# Patient Record
Sex: MALE | Race: WHITE | NOT HISPANIC OR LATINO | Employment: FULL TIME | URBAN - METROPOLITAN AREA
[De-identification: names, ages, dates, MRNs, and addresses within clinical notes are randomized per-mention and may not be internally consistent; named-entity substitution may affect disease eponyms.]

---

## 2024-04-08 ENCOUNTER — OFFICE VISIT (OUTPATIENT)
Dept: FAMILY MEDICINE CLINIC | Facility: CLINIC | Age: 24
End: 2024-04-08

## 2024-04-08 VITALS
WEIGHT: 137 LBS | HEART RATE: 74 BPM | BODY MASS INDEX: 18.16 KG/M2 | SYSTOLIC BLOOD PRESSURE: 122 MMHG | DIASTOLIC BLOOD PRESSURE: 68 MMHG | RESPIRATION RATE: 18 BRPM | OXYGEN SATURATION: 98 % | HEIGHT: 73 IN

## 2024-04-08 DIAGNOSIS — I49.9 CARDIAC ARRHYTHMIA, UNSPECIFIED CARDIAC ARRHYTHMIA TYPE: Primary | ICD-10-CM

## 2024-04-08 PROCEDURE — 99203 OFFICE O/P NEW LOW 30 MIN: CPT | Performed by: STUDENT IN AN ORGANIZED HEALTH CARE EDUCATION/TRAINING PROGRAM

## 2024-04-08 NOTE — PROGRESS NOTES
Pierce Alcantar DO  [facility] PR[facility]  [address]  [place] [id number] [phone]    Subjective      Name Jaron Marks MRN [id number]    2000 AGE/SEX 23 y.o. / male      Chief Complaint Chief Complaint   Patient presents with    Establish Care     Here to establish      Dizziness     X 1 week ago he drank energy drink and the started feeling weak and dizzy, having some palpitations. Symptoms lasted for about a week          Visit History for  2024    History of Present Illness  Jaron Marks is a 23 y.o. male who presented today for Establish Care (Here to establish/) and Dizziness (X 1 week ago he drank energy drink and the started feeling weak and dizzy, having some palpitations. 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 He experienced mild dizziness approximately one to two hours post-consumption, accompanied by a sensation akin to fainting. Despite not considering the cause, he continued to feel unwell for the remainder of the week, including fatigue, instability, and general malaise. He has abstained from energy drinks and Cokes since the incident. Accompanying symptoms included occasional chest discomfort lasting for a week, occasional tachycardia during sleep, and mild chest discomfort.     Symptoms have since resolved and he reports feeling completely normal. His caffeine intake does not exceed 150 mg, a habit he has not done recently. He speculated that his symptoms might be due to hypotension, but his blood pressure readings have consistently been within the normal range, never exceeding 130 systolic. He also reported feeling better during physical labor at his workplace.        Medications and Allergies   No current outpatient medications  No Known Allergies   I have reviewed the above medications and allergies     Objective:  "     Vitals Vitals:    04/08/24 1001   BP: 122/68   BP Location: Right arm   Patient Position: Sitting   Cuff Size: Adult   Pulse: 74   Resp: 18   SpO2: 98%   Weight: 62.1 kg (137 lb)   Height: 185.4 cm (73\")     Body mass index is 18.07 kg/m².    Physical Exam  Vitals reviewed.   Constitutional:       General: He is not in acute distress.     Appearance: He is not ill-appearing.   Cardiovascular:      Rate and Rhythm: Normal rate and regular rhythm.   Pulmonary:      Effort: Pulmonary effort is normal.      Breath sounds: Normal breath sounds.   Neurological:      Mental Status: He is alert.   Psychiatric:         Mood and Affect: Mood normal.         Behavior: Behavior normal.         Thought Content: Thought content normal.         Judgment: Judgment normal.             Assessment/Plan      Issues Addressed/ Plan   Diagnosis Plan   1. Cardiac arrhythmia, unspecified cardiac arrhythmia type           Assessment & Plan  1. Arrhythmia.  The patient's symptoms suggest an episode of palpitations. A comprehensive discussion was held regarding the various causes of arrhythmia, including the use of steroids. The patient was advised to monitor his heart rate using a Galaxy watch. He was also advised to reduce his caffeine intake. Should the patient experience another episode of arrhythmia, a 24-hour heart monitor will be considered.    There are no Patient Instructions on file for this visit.        Follow up  recommended Return in about 6 months (around 10/8/2024) for Annual physical.   - Dragon voice recognition software was utilized to complete this chart.  Every reasonable attempt was made to edit and correct the text, however some incorrect words may remain.   Pierce Alcantar DO    Patient or patient representative verbalized consent for the use of Ambient Listening during the visit with  Pierce Alcantar DO for chart documentation. 4/8/2024  10:42 EDT             "

## 2024-05-07 ENCOUNTER — OFFICE VISIT (OUTPATIENT)
Dept: FAMILY MEDICINE CLINIC | Facility: CLINIC | Age: 24
End: 2024-05-07
Payer: COMMERCIAL

## 2024-05-07 VITALS
DIASTOLIC BLOOD PRESSURE: 84 MMHG | HEIGHT: 73 IN | HEART RATE: 64 BPM | RESPIRATION RATE: 18 BRPM | WEIGHT: 137 LBS | BODY MASS INDEX: 18.16 KG/M2 | SYSTOLIC BLOOD PRESSURE: 132 MMHG | OXYGEN SATURATION: 98 %

## 2024-05-07 DIAGNOSIS — R00.0 TACHYCARDIA: Primary | ICD-10-CM

## 2024-05-07 PROCEDURE — 99213 OFFICE O/P EST LOW 20 MIN: CPT | Performed by: STUDENT IN AN ORGANIZED HEALTH CARE EDUCATION/TRAINING PROGRAM

## 2024-05-25 NOTE — PROGRESS NOTES
"    Pierce Alcantar DO  [facility]  [address] [place] [address] [address]  [place] [id number] [phone]    Subjective      Name Jaron Marks MRN [id number]    2000 AGE/SEX 23 y.o. / male      Chief Complaint Chief Complaint   Patient presents with    Dizziness     Was at work last night and got light headed, heart was racing and got very sweaty. Felt really weird. Also felt weird when he was going to sleep. Heart rate got up to 150 according to watch.          Visit History for  2024    History of Present Illness  Jaron Marks is a 23 y.o. male who presented today for Dizziness (Was at work last night and got light headed, heart was racing and got very sweaty. Felt really weird. Also felt weird when he was going to sleep. Heart rate got up to 150 according to watch. )      The patient reported an episode of tachycardia at approximately 11 PM last night, which necessitated early departure from work. He monitored his heart rate using a watch, which was recorded at 150, accompanied by perspiration, lightheadedness, and a pre-syncope sensation. Despite his efforts to relax at work, his heart rate fluctuated, reaching 100 while at rest. He consumed cocoa on the day of the episode, but has abstained from energy drinks for approximately 2 months. His daily exercise routine involves pushing and pulling 4000-pound cans. He denies experiencing dyspnea during these episodes. His diet consisted of pizza and cereal the previous day.        Medications and Allergies   No current outpatient medications  No Known Allergies   I have reviewed the above medications and allergies     Objective:      Vitals Vitals:    24 1612   BP: 132/84   BP Location: Left arm   Patient Position: Sitting   Cuff Size: Adult   Pulse: 64   Resp: 18   SpO2: 98%   Weight: 62.1 kg (137 lb)   Height: 185.4 cm (73\")     Body mass index is 18.07 kg/m².    Physical Exam  Vitals reviewed.   Constitutional:       " General: He is not in acute distress.     Appearance: He is not ill-appearing.   Cardiovascular:      Rate and Rhythm: Normal rate and regular rhythm.   Pulmonary:      Effort: Pulmonary effort is normal.      Breath sounds: Normal breath sounds.   Neurological:      Mental Status: He is alert.   Psychiatric:         Mood and Affect: Mood normal.         Behavior: Behavior normal.         Thought Content: Thought content normal.         Judgment: Judgment normal.       Physical Exam     Results          Assessment/Plan      Issues Addressed/ Plan   Diagnosis Plan   1. Tachycardia           Assessment & Plan  1. Tachycardia.  The patient's heart rate during physical exertion can reach up to 200. The patient was advised to augment his protein intake to a minimum of 30 grams prior to work to enhance muscle mass. He was also advised to maintain adequate hydration and consume food prior to consuming energy drinks. Additionally, he was recommended to consume electrolyte packages and Liquid IV. Should the patient's symptoms persist, a heart monitor will be considered.   BMI is below normal parameters (malnutrition). Recommendations: Going to work on increasing protein       There are no Patient Instructions on file for this visit.        Follow up  recommended Return in about 3 months (around 8/7/2024) for Annual physical.   - Dragon voice recognition software was utilized to complete this chart.  Every reasonable attempt was made to edit and correct the text, however some incorrect words may remain.   Pierce Alcantar DO    Patient or patient representative verbalized consent for the use of Ambient Listening during the visit with  Pierce Alcantar DO for chart documentation. 5/24/2024  23:31 EDT

## 2024-06-03 ENCOUNTER — OFFICE VISIT (OUTPATIENT)
Dept: FAMILY MEDICINE CLINIC | Facility: CLINIC | Age: 24
End: 2024-06-03
Payer: COMMERCIAL

## 2024-06-03 VITALS
HEIGHT: 73 IN | HEART RATE: 67 BPM | DIASTOLIC BLOOD PRESSURE: 76 MMHG | RESPIRATION RATE: 18 BRPM | SYSTOLIC BLOOD PRESSURE: 96 MMHG | WEIGHT: 136 LBS | BODY MASS INDEX: 18.02 KG/M2 | OXYGEN SATURATION: 100 %

## 2024-06-03 DIAGNOSIS — R42 DIZZINESS: ICD-10-CM

## 2024-06-03 DIAGNOSIS — R00.0 TACHYCARDIA: Primary | ICD-10-CM

## 2024-06-03 DIAGNOSIS — R42 LIGHTHEADEDNESS: ICD-10-CM

## 2024-06-03 PROCEDURE — 99213 OFFICE O/P EST LOW 20 MIN: CPT | Performed by: STUDENT IN AN ORGANIZED HEALTH CARE EDUCATION/TRAINING PROGRAM

## 2024-06-03 NOTE — PROGRESS NOTES
Venipuncture Blood Specimen Collection  Venipuncture performed in left arm by Anne Rain MA with good hemostasis. Patient tolerated the procedure well without complications.   06/03/24   Anne Rain MA

## 2024-06-04 LAB
25(OH)D3+25(OH)D2 SERPL-MCNC: 27.4 NG/ML (ref 30–100)
ALBUMIN SERPL-MCNC: 4.9 G/DL (ref 3.5–5.2)
ALBUMIN/GLOB SERPL: 1.7 G/DL
ALP SERPL-CCNC: 60 U/L (ref 39–117)
ALT SERPL-CCNC: 16 U/L (ref 1–41)
AST SERPL-CCNC: 15 U/L (ref 1–40)
BASOPHILS # BLD AUTO: 0.06 10*3/MM3 (ref 0–0.2)
BASOPHILS NFR BLD AUTO: 1.2 % (ref 0–1.5)
BILIRUB SERPL-MCNC: 0.4 MG/DL (ref 0–1.2)
BUN SERPL-MCNC: 11 MG/DL (ref 6–20)
BUN/CREAT SERPL: 12 (ref 7–25)
CALCIUM SERPL-MCNC: 10 MG/DL (ref 8.6–10.5)
CHLORIDE SERPL-SCNC: 104 MMOL/L (ref 98–107)
CO2 SERPL-SCNC: 26 MMOL/L (ref 22–29)
CREAT SERPL-MCNC: 0.92 MG/DL (ref 0.76–1.27)
EGFRCR SERPLBLD CKD-EPI 2021: 119.1 ML/MIN/1.73
EOSINOPHIL # BLD AUTO: 0.12 10*3/MM3 (ref 0–0.4)
EOSINOPHIL NFR BLD AUTO: 2.3 % (ref 0.3–6.2)
ERYTHROCYTE [DISTWIDTH] IN BLOOD BY AUTOMATED COUNT: 12.2 % (ref 12.3–15.4)
GLOBULIN SER CALC-MCNC: 2.9 GM/DL
GLUCOSE SERPL-MCNC: 88 MG/DL (ref 65–99)
HCT VFR BLD AUTO: 43.4 % (ref 37.5–51)
HGB BLD-MCNC: 14.5 G/DL (ref 13–17.7)
IMM GRANULOCYTES # BLD AUTO: 0.01 10*3/MM3 (ref 0–0.05)
IMM GRANULOCYTES NFR BLD AUTO: 0.2 % (ref 0–0.5)
IRON SATN MFR SERPL: 19 % (ref 20–50)
IRON SERPL-MCNC: 78 MCG/DL (ref 59–158)
LYMPHOCYTES # BLD AUTO: 1.96 10*3/MM3 (ref 0.7–3.1)
LYMPHOCYTES NFR BLD AUTO: 37.8 % (ref 19.6–45.3)
MCH RBC QN AUTO: 29.7 PG (ref 26.6–33)
MCHC RBC AUTO-ENTMCNC: 33.4 G/DL (ref 31.5–35.7)
MCV RBC AUTO: 88.8 FL (ref 79–97)
MONOCYTES # BLD AUTO: 0.35 10*3/MM3 (ref 0.1–0.9)
MONOCYTES NFR BLD AUTO: 6.8 % (ref 5–12)
NEUTROPHILS # BLD AUTO: 2.68 10*3/MM3 (ref 1.7–7)
NEUTROPHILS NFR BLD AUTO: 51.7 % (ref 42.7–76)
NRBC BLD AUTO-RTO: 0 /100 WBC (ref 0–0.2)
PLATELET # BLD AUTO: 236 10*3/MM3 (ref 140–450)
POTASSIUM SERPL-SCNC: 4.4 MMOL/L (ref 3.5–5.2)
PROT SERPL-MCNC: 7.8 G/DL (ref 6–8.5)
RBC # BLD AUTO: 4.89 10*6/MM3 (ref 4.14–5.8)
SODIUM SERPL-SCNC: 141 MMOL/L (ref 136–145)
TIBC SERPL-MCNC: 408 MCG/DL
UIBC SERPL-MCNC: 330 MCG/DL (ref 112–346)
VIT B12 SERPL-MCNC: 636 PG/ML (ref 211–946)
WBC # BLD AUTO: 5.18 10*3/MM3 (ref 3.4–10.8)

## 2024-06-08 NOTE — PROGRESS NOTES
Pierce Alcantar DO  [facility] PR[facility]  [facility]  [place] [id number] [phone]    Subjective      Name Jaron Marks MRN [id number]    2000 AGE/SEX 23 y.o. / male      Chief Complaint Chief Complaint   Patient presents with    Palpitations         Visit History for  2024    Jaron Marks is a 23 y.o. male who presented today for Palpitations     History of Present Illness  The patient has been experiencing persistent episodes of motion sickness and dizziness since Tuesday, which have not improved or worsened. These symptoms, which he had previously experienced, have not lasted this long. The dizziness is particularly noticeable when entering a vehicle. Despite the absence of vomiting, the severity of the dizziness remains unchanged. Previously, he experienced headaches, but these have since resolved. 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 Despite returning to work that night, he continued to feel unwell throughout the night. The dizziness is less severe when sitting or lying down, but upon standing, he experiences balance issues, difficulty focusing, and lightheadedness. Despite these symptoms, he maintains adequate hydration and avoids caffeine. His sleep patterns are normal, but he reports anxiety related to current circumstances. He has been taking a multivitamin for the past 3 weeks, but this has not provided relief. He also reports occasional palpitations. His dietary habits have improved, and he has lost weight.       Medications and Allergies   No current outpatient medications  No Known Allergies   I have reviewed the above medications and allergies     Objective:      Vitals Vitals:    24 0917   BP: 96/76   BP Location: Left arm   Patient Position: Sitting   Cuff Size: Adult   Pulse: 67   Resp: 18   SpO2: 100%   Weight: 61.7 kg (136 lb)   Height:  "185.4 cm (73\")     Body mass index is 17.94 kg/m².    Physical Exam  Vitals reviewed.   Constitutional:       General: He is not in acute distress.     Appearance: He is not ill-appearing.   Cardiovascular:      Rate and Rhythm: Normal rate and regular rhythm.   Pulmonary:      Effort: Pulmonary effort is normal.      Breath sounds: Normal breath sounds.   Neurological:      General: No focal deficit present.   Psychiatric:         Mood and Affect: Mood normal.         Behavior: Behavior normal.         Thought Content: Thought content normal.         Judgment: Judgment normal.        Physical Exam       Results       Assessment/Plan   Issues Addressed/ Plan   Diagnosis Plan   1. Tachycardia  CBC w AUTO Differential    Comprehensive metabolic panel    Iron Profile    Vitamin D 25 hydroxy    Vitamin B12    Holter Monitor - 72 Hour Up To 15 Days      2. Dizziness  CBC w AUTO Differential    Comprehensive metabolic panel    Iron Profile    Vitamin D 25 hydroxy    Vitamin B12    Holter Monitor - 72 Hour Up To 15 Days      3. Lightheadedness  CBC w AUTO Differential    Comprehensive metabolic panel    Iron Profile    Vitamin D 25 hydroxy    Vitamin B12    Holter Monitor - 72 Hour Up To 15 Days         Assessment & Plan  1. Dizziness.  The patient's eye examination and vestibular testing yielded normal results. A comprehensive blood workup will be conducted today. An event monitor will be worn for a duration of 7 days.    Follow-up  A follow-up appointment is scheduled for 1.5 to 2 weeks post-monitoring.           There are no Patient Instructions on file for this visit.   Follow up  recommended Return in about 2 weeks (around 6/17/2024) for dizziness.   - Dragon voice recognition software was utilized to complete this chart.  Every reasonable attempt was made to edit and correct the text, however some incorrect words may remain.   Pierce Alcantar, DO    Patient or patient representative verbalized consent for the use of " Ambient Listening during the visit with  Peirce Alcantar DO for chart documentation. 6/7/2024  22:55 EDT

## 2024-06-14 ENCOUNTER — OFFICE VISIT (OUTPATIENT)
Dept: FAMILY MEDICINE CLINIC | Facility: CLINIC | Age: 24
End: 2024-06-14
Payer: COMMERCIAL

## 2024-06-14 VITALS
BODY MASS INDEX: 17.76 KG/M2 | DIASTOLIC BLOOD PRESSURE: 64 MMHG | SYSTOLIC BLOOD PRESSURE: 108 MMHG | RESPIRATION RATE: 18 BRPM | OXYGEN SATURATION: 98 % | WEIGHT: 134 LBS | HEIGHT: 73 IN | HEART RATE: 70 BPM

## 2024-06-14 DIAGNOSIS — D50.8 IRON DEFICIENCY ANEMIA SECONDARY TO INADEQUATE DIETARY IRON INTAKE: ICD-10-CM

## 2024-06-14 DIAGNOSIS — R42 LIGHTHEADEDNESS: Primary | ICD-10-CM

## 2024-06-14 PROCEDURE — 99213 OFFICE O/P EST LOW 20 MIN: CPT | Performed by: STUDENT IN AN ORGANIZED HEALTH CARE EDUCATION/TRAINING PROGRAM

## 2024-06-14 RX ORDER — FERROUS SULFATE 325(65) MG
325 TABLET ORAL 2 TIMES DAILY
COMMUNITY

## 2024-06-14 RX ORDER — MELATONIN
1000 DAILY
COMMUNITY

## 2024-06-19 ENCOUNTER — PATIENT ROUNDING (BHMG ONLY) (OUTPATIENT)
Dept: FAMILY MEDICINE CLINIC | Facility: CLINIC | Age: 24
End: 2024-06-19
Payer: COMMERCIAL

## 2024-06-19 NOTE — PROGRESS NOTES
A Pitzi message has been sent to the patient for [facility]IENT [place] [facility]

## 2024-06-21 NOTE — PROGRESS NOTES
"    Pierce lAcantar DO  [facility] PRIMARY [facility]ARE  [address]  [place] [id number] [phone]    Subjective      Name Jaron Marks MRN [id number]    2000 AGE/SEX 23 y.o. / male      Chief Complaint Chief Complaint   Patient presents with    Dizziness     2 week follow up          Visit History for  2024    Jaron Marks is a 23 y.o. male who presented today for Dizziness (2 week follow up )     History of Present Illness  The patient presents for evaluation of multiple medical concerns.    The patient reports experiencing difficulty with bowel movements, necessitating the frequent use of toilet paper. He is currently on a regimen of iron supplements and vitamin D. He has recently incorporated protein into his diet and has incorporated more red meat, chicken, and eggs into his diet.       Medications and Allergies   Current Outpatient Medications   Medication Instructions    cholecalciferol (VITAMIN D3) 1,000 Units, Oral, Daily    ferrous sulfate 325 mg, Oral, 2 Times Daily     No Known Allergies   I have reviewed the above medications and allergies     Objective:      Vitals Vitals:    24 1146   BP: 108/64   BP Location: Left arm   Patient Position: Sitting   Cuff Size: Adult   Pulse: 70   Resp: 18   SpO2: 98%   Weight: 60.8 kg (134 lb)   Height: 185.4 cm (73\")     Body mass index is 17.68 kg/m².    Physical Exam  Vitals reviewed.   Constitutional:       General: He is not in acute distress.     Appearance: He is not ill-appearing.   Pulmonary:      Effort: Pulmonary effort is normal.   Psychiatric:         Mood and Affect: Mood normal.         Behavior: Behavior normal.         Thought Content: Thought content normal.         Judgment: Judgment normal.          Physical Exam  Vital Signs  Patient's weight is 134.     Results       Assessment/Plan   Issues Addressed/ Plan   Diagnosis Plan   1. Lightheadedness        2. Iron deficiency anemia secondary to inadequate " dietary iron intake           Assessment & Plan  1. Iron-deficiency anemia.  The patient is advised to transition from ferrous sulfate to ferrous citrate for a duration of one month. Additionally, he is encouraged to incorporate more red meat into his diet.    2. Health maintenance.  The patient is current with his vaccinations. The patient is advised to abstain from utilizing the heart monitor unless he is asymptomatic. He is encouraged to focus on weight reduction and muscle mass.    Follow-up  The patient is scheduled for a physical examination in 3 months.           There are no Patient Instructions on file for this visit.   Follow up  recommended Return in about 3 months (around 9/14/2024) for Annual physical.   - Dragon voice recognition software was utilized to complete this chart.  Every reasonable attempt was made to edit and correct the text, however some incorrect words may remain.   Pierce Alcantar DO    Patient or patient representative verbalized consent for the use of Ambient Listening during the visit with  Pierce Alcantar DO for chart documentation. 6/21/2024  14:01 EDT

## 2024-08-09 ENCOUNTER — PROCEDURE VISIT (OUTPATIENT)
Dept: FAMILY MEDICINE CLINIC | Facility: CLINIC | Age: 24
End: 2024-08-09
Payer: COMMERCIAL

## 2024-08-09 VITALS
HEART RATE: 65 BPM | OXYGEN SATURATION: 98 % | RESPIRATION RATE: 18 BRPM | DIASTOLIC BLOOD PRESSURE: 68 MMHG | SYSTOLIC BLOOD PRESSURE: 122 MMHG | WEIGHT: 137 LBS | BODY MASS INDEX: 18.16 KG/M2 | HEIGHT: 73 IN

## 2024-08-09 DIAGNOSIS — M54.50 ACUTE BILATERAL LOW BACK PAIN WITHOUT SCIATICA: Primary | ICD-10-CM

## 2024-08-09 PROCEDURE — 99213 OFFICE O/P EST LOW 20 MIN: CPT | Performed by: STUDENT IN AN ORGANIZED HEALTH CARE EDUCATION/TRAINING PROGRAM

## 2024-08-20 NOTE — PROGRESS NOTES
Pierce Alcantar DO  [facility]  [facility] [address]  [place] [id number] [phone]    Subjective      Name Jaron Marks MRN [id number]    2000 AGE/SEX 23 y.o. / male      Chief Complaint Chief Complaint   Patient presents with    Back Pain     Back pain for the past 3 weeks,lower back pain          Visit History for  2024    Jaron Marks is a 23 y.o. male who presented today for Back Pain (Back pain for the past 3 weeks,lower back pain )     History of Present Illness  The patient presents for evaluation of back pain.    He began experiencing sudden back pain, the cause of which he can not recall. The pain, which he describes as sharp, intensifies when he bends over or lifts heavy objects, and then gradually aches over time. The pain, which lasts for a day, subsides, and then recurs if he lifts heavy objects. Currently, he is pain-free, but he has been lifting heavy objects with his legs recently. He used a back brace for a day, but it caused pain in his buttocks, leading him to stop wearing it. Instead, he has been avoiding bending over. The pain is more pronounced on the right side, but standing straight up or lying down alleviates the pain. He does not perform stretches at work. He sleeps on his stomach but denies any neck problems. He is making efforts to improve his iron intake, taking his iron in the morning, and consuming a protein shake in the middle of the day. He took 1 or 2 ibuprofen, which did not provide relief.       Medications and Allergies   Current Outpatient Medications   Medication Instructions    cholecalciferol (VITAMIN D3) 1,000 Units, Oral, Daily    ferrous sulfate 325 mg, Oral, 2 Times Daily     No Known Allergies   I have reviewed the above medications and allergies     Objective:      Vitals Vitals:    24 1053   BP: 122/68   BP Location: Left arm   Patient Position: Sitting   Cuff Size: Adult   Pulse: 65   Resp: 18   SpO2: 98%  "  Weight: 62.1 kg (137 lb)   Height: 185.4 cm (73\")     Body mass index is 18.07 kg/m².    Physical Exam  Vitals reviewed.   Constitutional:       General: He is not in acute distress.     Appearance: He is not ill-appearing.   Pulmonary:      Effort: Pulmonary effort is normal.   Musculoskeletal:      Comments: Bilateral paraspinal fullness on lumbar spine   Psychiatric:         Mood and Affect: Mood normal.         Behavior: Behavior normal.         Thought Content: Thought content normal.         Judgment: Judgment normal.        Physical Exam       Results       Assessment/Plan   Issues Addressed/ Plan   Diagnosis Plan   1. Acute bilateral low back pain without sciatica           Assessment & Plan  1. Muscular tension.  There is no indication of bony abnormalities in his lower back. However, there is slight tightness in his sacral area, suggesting muscular tension. He was shown stretches to perform at home. Emphasis was placed on the importance of maintaining good body mechanics during stretching exercises and when lifting.  Core exercises utilizing body weight was recommended as well.  Postural awareness was emphasized. He was advised to take Aleve twice daily, and Tylenol every 4 to 6 hours as needed. Ice application for 15 to 20 minutes and heat pads for 30 minutes were suggested.       There are no Patient Instructions on file for this visit.   Follow up  recommended Return if symptoms worsen or fail to improve.   - Dragon voice recognition software was utilized to complete this chart.  Every reasonable attempt was made to edit and correct the text, however some incorrect words may remain.   Pierce Alcantar DO    Patient or patient representative verbalized consent for the use of Ambient Listening during the visit with  Pierce Alcantar DO for chart documentation. 8/19/2024  23:01 EDT    "

## 2024-10-08 ENCOUNTER — OFFICE VISIT (OUTPATIENT)
Dept: FAMILY MEDICINE CLINIC | Facility: CLINIC | Age: 24
End: 2024-10-08
Payer: COMMERCIAL

## 2024-10-08 VITALS
OXYGEN SATURATION: 97 % | DIASTOLIC BLOOD PRESSURE: 78 MMHG | HEIGHT: 73 IN | BODY MASS INDEX: 18.55 KG/M2 | WEIGHT: 140 LBS | RESPIRATION RATE: 16 BRPM | HEART RATE: 68 BPM | SYSTOLIC BLOOD PRESSURE: 92 MMHG

## 2024-10-08 DIAGNOSIS — E55.9 VITAMIN D DEFICIENCY: ICD-10-CM

## 2024-10-08 DIAGNOSIS — D50.8 IRON DEFICIENCY ANEMIA SECONDARY TO INADEQUATE DIETARY IRON INTAKE: ICD-10-CM

## 2024-10-08 DIAGNOSIS — Z00.00 ENCOUNTER FOR WELL ADULT EXAM WITHOUT ABNORMAL FINDINGS: Primary | ICD-10-CM

## 2024-10-08 DIAGNOSIS — R53.83 FATIGUE, UNSPECIFIED TYPE: ICD-10-CM

## 2024-10-08 DIAGNOSIS — Z13.220 SCREENING, LIPID: ICD-10-CM

## 2024-10-08 PROCEDURE — 36415 COLL VENOUS BLD VENIPUNCTURE: CPT | Performed by: STUDENT IN AN ORGANIZED HEALTH CARE EDUCATION/TRAINING PROGRAM

## 2024-10-08 PROCEDURE — 99395 PREV VISIT EST AGE 18-39: CPT | Performed by: STUDENT IN AN ORGANIZED HEALTH CARE EDUCATION/TRAINING PROGRAM

## 2024-10-08 NOTE — PROGRESS NOTES
Pierce Alcantar DO  [facility] PRIMARY CARE  [facility]  [place] [id number] [phone]    Subjective      Name Jaron Marks MRN [id number]    2000 AGE/SEX 23 y.o. / male      Chief Complaint Chief Complaint   Patient presents with    Annual Exam     Here for annual exam        Visit History for  10/08/2024    History of Present Illness  Jaron Marks 23 y.o. male who presents for an Annual Wellness Visit. He has a history of There is no problem list on file for this patient.    He reports feeling well overall, with some fluctuations in his condition. He has been taking iron supplements twice daily, once upon returning home and once before bedtime. He also takes vitamin D at a dosage of 5000 IU.    He has recently started an exercise regimen, which includes pull-ups, push-ups, planks, and sit-ups, performed three times a week.    He has been experiencing unusual headaches recently but reports no vision problems or squinting.        Current Life Goals: Thinking about  program.      Patient Care Team:  Pierce Alcantar DO as PCP - General (Family Medicine)      Health Habits     Diet & Exercise:       Diet:     [x] Generally Healthy   [] Low Carb    [] Vegetarian     [] Generally Unhealthy   [] Gluten Free  [] Vegan       Exercise:     Type: aerobics and body resistance   Frequency: 3 times/week.       Tobacco Use:     Social History     Tobacco Use   Smoking Status Never    Passive exposure: Never   Smokeless Tobacco Never        Jaron Marks  reports that he has never smoked. He has never been exposed to tobacco smoke. He has never used smokeless tobacco.              Alcohol Use:     Social History     Substance and Sexual Activity   Alcohol Use Yes    Comment: socially         Counseling Given: [] Yes  [x] No       Dental Exam:   [x] Up to date  [] Scheduled  [] Needed   Last Exam: Aug 2024     Eye Exam:   [] Up to date  [] Scheduled  [] Needed   Last  "Exam: Not needed.  Good vision     Screenings:     PHQ-9 Depression Screening  Little interest or pleasure in doing things? 0-->not at all   Feeling down, depressed, or hopeless? 0-->not at all   Trouble falling or staying asleep, or sleeping too much?     Feeling tired or having little energy?     Poor appetite or overeating?     Feeling bad about yourself - or that you are a failure or have let yourself or your family down?     Trouble concentrating on things, such as reading the newspaper or watching television?     Moving or speaking so slowly that other people could have noticed? Or the opposite - being so fidgety or restless that you have been moving around a lot more than usual?     Thoughts that you would be better off dead, or of hurting yourself in some way?     PHQ-9 Total Score 0   If you checked off any problems, how difficult have these problems made it for you to do your work, take care of things at home, or get along with other people?        Hepatitis C Screening:   No results found for: \"HEPCVIRUSABY\"    Lung Cancer Screening: Qualifies? [] Yes  [] No   Completed:    Colon Cancer Screening:   Last Completed Colonoscopy       This patient has no relevant Health Maintenance data.             Prostate Cancer Screening:   No results found for: \"PSA\"       Advance Care Planning  Patient does not have an advance directive, information provided.    Review of Systems    The following portions of the patient's history were reviewed and updated as appropriate: allergies, current medications, past family history, past medical history, past social history, past surgical history and problem list.     Past Medical, Family, Social History     Medical History: has no past medical history on file.   Surgical History: has no past surgical history on file.   Family History: family history includes Hypertension in his father.   Social History: reports that he has never smoked. He has never been exposed to tobacco " "smoke. He has never used smokeless tobacco. He reports current alcohol use. He reports that he does not currently use drugs.       Medications and Allergies   Current Outpatient Medications   Medication Instructions    cholecalciferol (VITAMIN D3) 1,000 Units, Oral, Daily    ferrous sulfate 325 mg, Oral, 2 Times Daily     No Known Allergies       Objective:      Vitals Vitals:    10/08/24 0912   BP: 92/78   BP Location: Left arm   Patient Position: Sitting   Cuff Size: Adult   Pulse: 68   Resp: 16   SpO2: 97%   Weight: 63.5 kg (140 lb)   Height: 185.4 cm (73\")     Body mass index is 18.47 kg/m².    Physical Exam  Vitals reviewed.   Constitutional:       General: He is not in acute distress.     Appearance: He is not ill-appearing.   HENT:      Right Ear: Tympanic membrane, ear canal and external ear normal.      Left Ear: Tympanic membrane, ear canal and external ear normal.      Mouth/Throat:      Mouth: Mucous membranes are moist.      Pharynx: No posterior oropharyngeal erythema.   Cardiovascular:      Rate and Rhythm: Normal rate and regular rhythm.   Pulmonary:      Effort: Pulmonary effort is normal.      Breath sounds: Normal breath sounds.   Musculoskeletal:         General: Normal range of motion.   Neurological:      Mental Status: He is alert.   Psychiatric:         Mood and Affect: Mood normal.         Behavior: Behavior normal.         Thought Content: Thought content normal.         Judgment: Judgment normal.            Assessment/Plan      Issues Addressed/ Plan   Diagnosis Plan   1. Encounter for well adult exam without abnormal findings        2. Iron deficiency anemia secondary to inadequate dietary iron intake  Iron Profile    CBC w AUTO Differential      3. Vitamin D deficiency  Vitamin D 25 hydroxy      4. Fatigue, unspecified type  Comprehensive metabolic panel      5. Screening, lipid  Lipid panel        Assessment & Plan  He has gained 5 pounds, and his BMI has increased from 17 to 18. A diet " rich in protein was recommended. He is advised to continue regular exercise, ideally 3 to 4 times per week, including pull-ups, push-ups, planks, and sit-ups.    Iron Deficiency.  He reports not taking iron supplements correctly on an empty stomach. He is advised to take iron tablets on an empty stomach to improve absorption. He currently takes one iron tablet twice a day, usually when he gets home and before going to work.    Vitamin D Supplementation.  He is currently taking vitamin D supplements, 5000 IU daily. He is advised to continue this regimen.    Headaches.  He reports experiencing headaches recently, typically located in the forehead area. Differential diagnosis includes stress headaches and sinus-related headaches. No vision problems or squinting were reported. Stress management techniques were discussed.  May address at a future date.         Discussion:    Wears seat belt? [x] Yes  [] No     Wears sunscreen? [x] Yes  [] No      BMI: Body mass index is 18.47 kg/m².             There are no Patient Instructions on file for this visit.      Follow up  recommended Return in about 1 year (around 10/8/2025) for Annual physical.     Pierce Alcantar, DO

## 2024-10-08 NOTE — PROGRESS NOTES
Venipuncture Blood Specimen Collection  Venipuncture performed in left arm by Anne Rain MA with good hemostasis. Patient tolerated the procedure well without complications.   10/08/24   Anne Rain MA

## 2024-10-09 LAB
25(OH)D3+25(OH)D2 SERPL-MCNC: 40.7 NG/ML (ref 30–100)
ALBUMIN SERPL-MCNC: 4.6 G/DL (ref 3.5–5.2)
ALBUMIN/GLOB SERPL: 1.7 G/DL
ALP SERPL-CCNC: 54 U/L (ref 39–117)
ALT SERPL-CCNC: 16 U/L (ref 1–41)
AST SERPL-CCNC: 19 U/L (ref 1–40)
BASOPHILS # BLD AUTO: 0.05 10*3/MM3 (ref 0–0.2)
BASOPHILS NFR BLD AUTO: 0.6 % (ref 0–1.5)
BILIRUB SERPL-MCNC: 0.4 MG/DL (ref 0–1.2)
BUN SERPL-MCNC: 13 MG/DL (ref 6–20)
BUN/CREAT SERPL: 14.1 (ref 7–25)
CALCIUM SERPL-MCNC: 9.8 MG/DL (ref 8.6–10.5)
CHLORIDE SERPL-SCNC: 102 MMOL/L (ref 98–107)
CHOLEST SERPL-MCNC: 143 MG/DL (ref 0–200)
CO2 SERPL-SCNC: 26.2 MMOL/L (ref 22–29)
CREAT SERPL-MCNC: 0.92 MG/DL (ref 0.76–1.27)
EGFRCR SERPLBLD CKD-EPI 2021: 119.1 ML/MIN/1.73
EOSINOPHIL # BLD AUTO: 0.14 10*3/MM3 (ref 0–0.4)
EOSINOPHIL NFR BLD AUTO: 1.7 % (ref 0.3–6.2)
ERYTHROCYTE [DISTWIDTH] IN BLOOD BY AUTOMATED COUNT: 12.7 % (ref 12.3–15.4)
GLOBULIN SER CALC-MCNC: 2.7 GM/DL
GLUCOSE SERPL-MCNC: 78 MG/DL (ref 65–99)
HCT VFR BLD AUTO: 41.1 % (ref 37.5–51)
HDLC SERPL-MCNC: 44 MG/DL (ref 40–60)
HGB BLD-MCNC: 14.1 G/DL (ref 13–17.7)
IMM GRANULOCYTES # BLD AUTO: 0.01 10*3/MM3 (ref 0–0.05)
IMM GRANULOCYTES NFR BLD AUTO: 0.1 % (ref 0–0.5)
IRON SATN MFR SERPL: 22 % (ref 20–50)
IRON SERPL-MCNC: 77 MCG/DL (ref 59–158)
LDLC SERPL CALC-MCNC: 90 MG/DL (ref 0–100)
LYMPHOCYTES # BLD AUTO: 3.38 10*3/MM3 (ref 0.7–3.1)
LYMPHOCYTES NFR BLD AUTO: 41.6 % (ref 19.6–45.3)
MCH RBC QN AUTO: 31.3 PG (ref 26.6–33)
MCHC RBC AUTO-ENTMCNC: 34.3 G/DL (ref 31.5–35.7)
MCV RBC AUTO: 91.3 FL (ref 79–97)
MONOCYTES # BLD AUTO: 0.44 10*3/MM3 (ref 0.1–0.9)
MONOCYTES NFR BLD AUTO: 5.4 % (ref 5–12)
NEUTROPHILS # BLD AUTO: 4.1 10*3/MM3 (ref 1.7–7)
NEUTROPHILS NFR BLD AUTO: 50.6 % (ref 42.7–76)
NRBC BLD AUTO-RTO: 0 /100 WBC (ref 0–0.2)
PLATELET # BLD AUTO: 252 10*3/MM3 (ref 140–450)
POTASSIUM SERPL-SCNC: 4 MMOL/L (ref 3.5–5.2)
PROT SERPL-MCNC: 7.3 G/DL (ref 6–8.5)
RBC # BLD AUTO: 4.5 10*6/MM3 (ref 4.14–5.8)
SODIUM SERPL-SCNC: 138 MMOL/L (ref 136–145)
TIBC SERPL-MCNC: 358 MCG/DL
TRIGL SERPL-MCNC: 38 MG/DL (ref 0–150)
UIBC SERPL-MCNC: 281 MCG/DL (ref 112–346)
VLDLC SERPL CALC-MCNC: 9 MG/DL (ref 5–40)
WBC # BLD AUTO: 8.12 10*3/MM3 (ref 3.4–10.8)